# Patient Record
Sex: MALE | Race: WHITE
[De-identification: names, ages, dates, MRNs, and addresses within clinical notes are randomized per-mention and may not be internally consistent; named-entity substitution may affect disease eponyms.]

---

## 2018-04-15 ENCOUNTER — HOSPITAL ENCOUNTER (EMERGENCY)
Dept: HOSPITAL 65 - ER | Age: 53
Discharge: HOME | End: 2018-04-15
Payer: COMMERCIAL

## 2018-04-15 VITALS — SYSTOLIC BLOOD PRESSURE: 183 MMHG | DIASTOLIC BLOOD PRESSURE: 117 MMHG

## 2018-04-15 VITALS — DIASTOLIC BLOOD PRESSURE: 93 MMHG | SYSTOLIC BLOOD PRESSURE: 164 MMHG

## 2018-04-15 VITALS — WEIGHT: 165 LBS | BODY MASS INDEX: 23.1 KG/M2 | HEIGHT: 71 IN
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
## 2018-04-15 DIAGNOSIS — F17.220: ICD-10-CM
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
## 2018-04-15 DIAGNOSIS — S92.421B: Primary | ICD-10-CM

## 2018-04-15 PROCEDURE — 12002 RPR S/N/AX/GEN/TRNK2.6-7.5CM: CPT

## 2018-04-15 PROCEDURE — 99283 EMERGENCY DEPT VISIT LOW MDM: CPT

## 2018-04-15 PROCEDURE — 96372 THER/PROPH/DIAG INJ SC/IM: CPT

## 2018-04-15 PROCEDURE — 90471 IMMUNIZATION ADMIN: CPT

## 2018-04-15 PROCEDURE — A4216 STERILE WATER/SALINE, 10 ML: HCPCS

## 2018-04-15 PROCEDURE — 73630 X-RAY EXAM OF FOOT: CPT

## 2018-04-15 PROCEDURE — 99284 EMERGENCY DEPT VISIT MOD MDM: CPT

## 2018-04-15 NOTE — DIREP
PROCEDURE:XRAY FOOT MIN 3 VWS-RT

 

COMPARISON:None.

 

INDICATIONS:Pain right 1st toe

 

FINDINGS:

BONES:A comminuted fracture of the 1st distal phalanx is noted with fracture 

involving the articular surface of the 1st IP joint.

JOINTS:Normal.

SOFT TISSUES:Normal.

OTHER:No additional findings.

 

CONCLUSION:

1.  Comminuted, intra-articular fracture of the 1st distal phalanx.  

 

 

Dictated by: Akash Garrett M.D. on 04/15/2018 at 03:49 PM     

Electronically Signed By: Akash Garrett M.D. on 04/15/2018 at 03:50 PM

## 2018-04-15 NOTE — ER.PDOC
General


Chief Complaint:  Extremities


Stated Complaint:  RIGHT LEG LACERATION


Time seen by MD:  15:33


Source:  patient


Exam Limitations:  no limitations





History of Present Illness


Initial Comments


Laceration to right 1st toe by a pit bull


Onset:  just prior to arrival


Where:  other


Severity:  moderate


Modifying Factors:  pain on movement


Allergies:  


Coded Allergies:  


     No Known Allergies (Unverified , 4/15/18)





Past Medical History


Medical History:  no pertinent history


Surgical History:  no surgical history





Social History


Smoking:  chew


Alcohol Use:  none


Drug Use:  none





Review of Systems


Constitutional:  no symptoms reported


Respiratory:  no symptoms reported


Cardiovascular:  no symptoms reported


Gastrointestinal:  no symptoms reported


Skin:  see HPI


All Other Systems:  Reviewed and Negative





Physical Exam


General Appearance:  Alert, No Apparent Distress


Foot:  tenderness (right 1st toe with laceration), partial nail avulsion


Ankle:  nml inspection, non-tender, nml ROM, no joint swelling, skin intact


Gait:  limited by pain


Neuro:  sensation nml


Tendons:  tendon function nml


Leg/Knee/Thigh:  uninjured above ankle


Skin:  warm/dry


Head/ENT:  nml inspection, pharynx nml


Neck/Back:  nml inspection, non-tender


Resp/CVS:  no resp distress


Abdomen:  non-tender, no organomegaly





ED LACERATION WOUND REPAIR


# of Wounds/Lacerations Presen:  1


Wound Location & Length (Requi:  Right 1st toe


Wound Length (cm):  3


Wound cleaned:  betadine


Anesthesia type:  digital block


Anesthesia:  1% Lidocaine


Volume Anesthetic (ccs):  10


Wound's Depth, Shape:  irregular, nail-avulsed


Irrigated w/ Saline (ccs):  2000


Wound Debrided:  minimal


Wound Repaired With:  sutures


Suture Size/Type:  4:0, prolene


Suture Style:  interupted


Number of Sutures:  7


Nail/Nail Matrix:  nail excised


Sterile Dressing Applied?:  Yes





Progress


Progress


Patient properly counseled of the risk of infection and will need to follow up 

with an Orthopedic Surgeon or Podiatrist tomorrow. He comes from [facility] and will see his Ortho there thus no need to call Dr. Escobar for a 

follow up.





EKG/XRAY/CT/US


XRAY Comments:  Comminuted fracture 1st distal Phalanx





Course


Blood Pressure Systolic:  183


Blood Pressure Diastolic:  117


Blood Pressure Mean:  139





Departure


Time of Disposition:  17:04


Disposition:  01 HOME, SELF-CARE


Impression:  


 Primary Impression:  


 Laceration of toe with damage to nail


 Additional Impression:  


 Fracture of toe of right foot


Condition:  Stable


Referrals:  


PCP,UNKNOWN (PCP)


PRIMARY CARE PROVIDER





Additional Instructions:  


Keflex


Tylenol with Codeine


F/U with Ortho or Podiatrist tomorrow.


Duration or Time Spent with Pa:  90 mins





Problem Qualifiers








 Primary Impression:  


 Laceration of toe with damage to nail


 Encounter type:  initial encounter  Toe:  great toe  Foreign body presence:  

without foreign body  Laterality:  right  Qualified Codes:  S91.211A - 

Laceration without foreign body of right great toe with damage to nail, initial 

encounter


 Additional Impression:  


 Fracture of toe of right foot


 Encounter type:  initial encounter  Toe:  great toe  Fracture type:  open  

Phalanx:  distal  Fracture alignment:  displaced  Qualified Codes:  S92.421B - 

Displaced fracture of distal phalanx of right great toe, initial encounter for 

open fracture








ANTON HILL MD Apr 15, 2018 15:36